# Patient Record
Sex: MALE | Race: OTHER | NOT HISPANIC OR LATINO | Employment: UNEMPLOYED | URBAN - METROPOLITAN AREA
[De-identification: names, ages, dates, MRNs, and addresses within clinical notes are randomized per-mention and may not be internally consistent; named-entity substitution may affect disease eponyms.]

---
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
## 2025-01-20 ENCOUNTER — APPOINTMENT (OUTPATIENT)
Dept: DERMATOLOGY | Facility: CLINIC | Age: 20
End: 2025-01-20
Payer: COMMERCIAL

## 2025-01-20 ENCOUNTER — LAB (OUTPATIENT)
Dept: LAB | Facility: LAB | Age: 20
End: 2025-01-20
Payer: COMMERCIAL

## 2025-01-20 DIAGNOSIS — L30.8 RETINOID DERMATITIS: ICD-10-CM

## 2025-01-20 DIAGNOSIS — L70.0 ACNE VULGARIS: Primary | ICD-10-CM

## 2025-01-20 DIAGNOSIS — Z79.899 HIGH RISK MEDICATION USE: ICD-10-CM

## 2025-01-20 DIAGNOSIS — K13.0 CHEILITIS: ICD-10-CM

## 2025-01-20 DIAGNOSIS — L85.3 XEROSIS CUTIS: ICD-10-CM

## 2025-01-20 DIAGNOSIS — L70.0 ACNE VULGARIS: ICD-10-CM

## 2025-01-20 PROCEDURE — 84450 TRANSFERASE (AST) (SGOT): CPT

## 2025-01-20 PROCEDURE — 84478 ASSAY OF TRIGLYCERIDES: CPT

## 2025-01-20 PROCEDURE — 99214 OFFICE O/P EST MOD 30 MIN: CPT | Performed by: DERMATOLOGY

## 2025-01-20 PROCEDURE — 84460 ALANINE AMINO (ALT) (SGPT): CPT

## 2025-01-20 PROCEDURE — 85027 COMPLETE CBC AUTOMATED: CPT

## 2025-01-20 NOTE — PROGRESS NOTES
Bud Chapin is a 19 y.o. male who presents for the following: Acne isotretinoin follow-up (The patient is currently taking prescription Accutane/Isotretinoin. The patient denies headaches, gastrointestinal upset, changes in mood, depression, suicidal ideation, bloody stool, vision changes, or joint pain./The patient endorses having dry lips and dry skin, managed with emollients.) and Retinoid Dermatitis (Hands- pt states using clobetasol ointment prn with good response. Improving. ).     Review of Systems:  No other skin or systemic complaints other than what is documented elsewhere in the note.    The following portions of the chart were reviewed this encounter and updated as appropriate:   Allergies  Meds  Problems  Med Hx  Surg Hx  Fam Hx                Objective   Well appearing patient in no apparent distress; mood and affect are within normal limits.    A focused skin examination was performed. All findings within normal limits unless otherwise noted below.    Assessment/Plan   1. Acne vulgaris  Head - Anterior (Face)  One active pustule R jaw line; PIE    -Given recalcitrant nature of acne, the patient continues on second course of Rx Accutane/Isotretinoin.   -Tolerating well with no systemic side effects  -Will check CBC, LFTs, TGL prior to monthly prescription dispensing; standing labs in Epic; will send rx after labs received and reviewed.   -Potential side effects reviewed with the patient today including teratogenicity and birth defects, lipid abnormalities (hyperTGL which may result in pancreatitis), liver or kidney failure, risk of depression or suicide, risk of potential inflammatory bowel disease  -Common and expected side effects include xerosis (dryness) of the lips and skin, nose bleeds due to dryness of the nasal mucosa, and redness/rash of the skin (retinoid dermatitis). -Recommend liberal emollients (Vaseline to the lips, Aveeno Eczema Therapy to the body) to be used  daily.  -Discussed with the patient that they should not get anyone pregnant while on therapy, do not donate blood, and do not give their medication to anyone. The patient verbalizes understanding and agreement.  -Signed iPledge consent form is on file    -If labs permissible, plan to continue rx Accutane/Isotretinoin as tolerated until a cumulative dose of 220 mg/kg is obtained.   -Patient is to take the medication with a fatty food/meal to aid in absorption of the medication (if not taking branded Absorica)    [id number]  -Pt weight (kg): 76kg  -Goal dose (220mg/kg): 16,700 mg  -Current cumulative dose: 9,600 mg    Related Procedures  Follow Up In Dermatology - Established Patient    Related Medications  ISOtretinoin (Accutane) 40 mg capsule  Take one pill by mouth twice daily with a fatty meal    2. Retinoid dermatitis  Left Hand - Posterior, Right Hand - Posterior  Clear today    Improved  -May restart clobetasol ointment twice daily when active as needed  -Discussed with/information given to the patient on the risks, benefits and alternatives of the usage of topical corticosteroids, including but not limited to: atrophy (thinning of the skin), striae (stretch marks), telangiectasia (blood vessel growth), and dyspigmentation (discoloration of the skin).  -Recommend to limit long-term use of topical corticosteroids to less than 14 days per month to reduce risk of side effects.      Related Medications  clobetasol (Temovate) 0.05 % ointment  Apply to affected areas on hands twice daily when active as needed. Use less than 14 days per month.    3. Cheilitis  Lips  Erythema and xerosis of vermilion lips    -Discussed nature of condition  -Expected cutaneous side effect for rx Accutane/Isotretinoin  -Use liberal emollients with plain vaseline as many times per day as possible  -Avoid traditional chap sticks as these often contain desiccants which worsen the issue  -Prescription topical steroids may be used  if needed    4. Xerosis cutis  Dry skin    -Continue liberal emollients with moisturizers designed for eczematous skin  -Continue gentle skin cleansers designed for eczematous skin  -Continue careful sun protection with SPF at least 30 as well as sun avoidance        Follow up in 1 month for acne (accutane)  Discussed if there are any changes or development of concerning symptoms (lesion/skin condition is changing, bleeding, enlarging, or worsening) the patient is to contact my office. The patient verbalizes understanding.    Maren Amado MD  1/20/2025      Addendum: Labs received. Permissible to continue accutane. Rx sent. Confirmed in iPledge. Patient may now  medication. Nurse to notify patient.    Maren Amado MD  01/21/25

## 2025-01-21 LAB
ALT SERPL W P-5'-P-CCNC: 24 U/L (ref 10–52)
AST SERPL W P-5'-P-CCNC: 21 U/L (ref 9–39)
ERYTHROCYTE [DISTWIDTH] IN BLOOD BY AUTOMATED COUNT: 12.5 % (ref 11.5–14.5)
HCT VFR BLD AUTO: 45.6 % (ref 41–52)
HGB BLD-MCNC: 15.3 G/DL (ref 13.5–17.5)
MCH RBC QN AUTO: 28.7 PG (ref 26–34)
MCHC RBC AUTO-ENTMCNC: 33.6 G/DL (ref 32–36)
MCV RBC AUTO: 86 FL (ref 80–100)
NRBC BLD-RTO: 0 /100 WBCS (ref 0–0)
PLATELET # BLD AUTO: 240 X10*3/UL (ref 150–450)
RBC # BLD AUTO: 5.33 X10*6/UL (ref 4.5–5.9)
TRIGL SERPL-MCNC: 185 MG/DL (ref 0–89)
WBC # BLD AUTO: 6.2 X10*3/UL (ref 4.4–11.3)

## 2025-01-21 RX ORDER — ISOTRETINOIN 40 MG/1
CAPSULE ORAL
Qty: 60 CAPSULE | Refills: 0 | Status: SHIPPED | OUTPATIENT
Start: 2025-01-21

## 2025-02-19 ENCOUNTER — APPOINTMENT (OUTPATIENT)
Dept: DERMATOLOGY | Facility: CLINIC | Age: 20
End: 2025-02-19
Payer: COMMERCIAL

## 2025-02-19 DIAGNOSIS — L98.9 DERMATOLOGIC PROBLEM: ICD-10-CM

## 2025-02-19 DIAGNOSIS — K13.0 CHEILITIS: ICD-10-CM

## 2025-02-19 DIAGNOSIS — L70.0 ACNE VULGARIS: Primary | ICD-10-CM

## 2025-02-19 DIAGNOSIS — L30.8 RETINOID DERMATITIS: ICD-10-CM

## 2025-02-19 DIAGNOSIS — L85.3 XEROSIS CUTIS: ICD-10-CM

## 2025-02-19 DIAGNOSIS — Z79.899 HIGH RISK MEDICATION USE: ICD-10-CM

## 2025-02-19 PROCEDURE — 99214 OFFICE O/P EST MOD 30 MIN: CPT | Performed by: DERMATOLOGY

## 2025-02-19 RX ORDER — CLOBETASOL PROPIONATE 0.5 MG/G
OINTMENT TOPICAL
Qty: 30 G | Refills: 1 | Status: SHIPPED | OUTPATIENT
Start: 2025-02-19

## 2025-02-19 NOTE — PROGRESS NOTES
Subjective     Lori Chapin is a 20 y.o. male who presents for the following: Acne isotretinoin follow-up (The patient is currently taking prescription Accutane/Isotretinoin. The patient denies headaches, gastrointestinal upset, changes in mood, depression, suicidal ideation, bloody stool, vision changes, or joint pain./The patient endorses having dry lips and dry skin, managed with emollients.).     Review of Systems:  No other skin or systemic complaints other than what is documented elsewhere in the note.    The following portions of the chart were reviewed this encounter and updated as appropriate:   Allergies  Meds  Problems  Med Hx  Surg Hx  Fam Hx                  Objective   Well appearing patient in no apparent distress; mood and affect are within normal limits.    A focused skin examination was performed. All findings within normal limits unless otherwise noted below.    Assessment/Plan   1. Acne vulgaris  Head - Anterior (Face)  Clear today; PIE    -Given recalcitrant nature of acne, the patient continues on second course of Rx Accutane/Isotretinoin.   -Tolerating well with no systemic side effects  -Will check CBC, LFTs, TGL prior to monthly prescription dispensing; standing labs in [facility]; will send rx after labs received and reviewed.   -Potential side effects reviewed with the patient today including teratogenicity and birth defects, lipid abnormalities (hyperTGL which may result in pancreatitis), liver or kidney failure, risk of depression or suicide, risk of potential inflammatory bowel disease  -Common and expected side effects include xerosis (dryness) of the lips and skin, nose bleeds due to dryness of the nasal mucosa, and redness/rash of the skin (retinoid dermatitis). -Recommend liberal emollients (Vaseline to the lips, Aveeno Eczema Therapy to the body) to be used daily.  -Discussed with the patient that they should not get anyone pregnant while on therapy, do not donate blood, and do  not give their medication to anyone. The patient verbalizes understanding and agreement.  -Signed iPledge consent form is on file    -If labs permissible, plan to continue rx Accutane/Isotretinoin as tolerated until a cumulative dose of 220 mg/kg is obtained.   -Patient is to take the medication with a fatty food/meal to aid in absorption of the medication (if not taking branded Absorica)    [id number]  -Pt weight (kg): 76kg  -Goal dose (220mg/kg): 16,700 mg  -Current cumulative dose: 12,000 mg    Related Procedures  Follow Up In Dermatology - Established Patient    Related Medications  ISOtretinoin (Accutane) 40 mg capsule  Take one pill by mouth twice daily with a fatty meal    2. Cheilitis  Lips  Erythema and xerosis of vermilion lips    -Discussed nature of condition  -Expected cutaneous side effect for rx Accutane/Isotretinoin  -Use liberal emollients with plain vaseline as many times per day as possible  -Avoid traditional chap sticks as these often contain desiccants which worsen the issue  -Prescription topical steroids may be used if needed    3. Xerosis cutis  Dry skin    -Continue liberal emollients with moisturizers designed for eczematous skin  -Continue gentle skin cleansers designed for eczematous skin  -Continue careful sun protection with SPF at least 30 as well as sun avoidance    4. Retinoid dermatitis  Left Hand - Posterior, Right Hand - Posterior  Erythematous plaques on the hands    Flaring  -Restart clobetasol ointment twice daily when active as needed; refilled today  -Discussed with/information given to the patient on the risks, benefits and alternatives of the usage of topical corticosteroids, including but not limited to: atrophy (thinning of the skin), striae (stretch marks), telangiectasia (blood vessel growth), and dyspigmentation (discoloration of the skin).  -Recommend to limit long-term use of topical corticosteroids to less than 14 days per month to reduce risk of side  effects.      Related Medications  clobetasol (Temovate) 0.05 % ointment  Apply to affected areas on hands twice daily when active as needed. Use less than 14 days per month.    5. Dermatologic problem    Related Procedures  Follow Up In Dermatology - Established Patient        Follow up in 1 month for accutane  Discussed if there are any changes or development of concerning symptoms (lesion/skin condition is changing, bleeding, enlarging, or worsening) the patient is to contact my office. The patient verbalizes understanding.    Maren Amado MD  2/19/2025    Addendum: Labs received. Permissible to continue accutane. Rx sent to patient's pharmacy. Confirmed in iPledge. Nurse to notify patient.  Maren Amado MD  02/20/25

## 2025-02-20 LAB
ALT SERPL-CCNC: 39 U/L (ref 9–46)
AST SERPL-CCNC: 29 U/L (ref 10–40)
ERYTHROCYTE [DISTWIDTH] IN BLOOD BY AUTOMATED COUNT: 12.8 % (ref 11–15)
HCT VFR BLD AUTO: 47.1 % (ref 38.5–50)
HGB BLD-MCNC: 15.4 G/DL (ref 13.2–17.1)
MCH RBC QN AUTO: 28.5 PG (ref 27–33)
MCHC RBC AUTO-ENTMCNC: 32.7 G/DL (ref 32–36)
MCV RBC AUTO: 87.2 FL (ref 80–100)
PLATELET # BLD AUTO: 260 THOUSAND/UL (ref 140–400)
PMV BLD REES-ECKER: 10.2 FL (ref 7.5–12.5)
RBC # BLD AUTO: 5.4 MILLION/UL (ref 4.2–5.8)
TRIGL SERPL-MCNC: 177 MG/DL
WBC # BLD AUTO: 5.5 THOUSAND/UL (ref 3.8–10.8)

## 2025-02-20 RX ORDER — ISOTRETINOIN 40 MG/1
CAPSULE ORAL
Qty: 60 CAPSULE | Refills: 0 | Status: SHIPPED | OUTPATIENT
Start: 2025-02-20

## 2025-02-20 NOTE — RESULT ENCOUNTER NOTE
RN called pt and sent to voicemail. RN left voicemail per communication form. RN notified in vm that labs  are received and it is permissible to continue accutane. RN in vm notified Rx sent to patient's pharmacy and confirmed in iPledge.    Sara Gonzalez BSN RN

## 2025-03-19 ENCOUNTER — APPOINTMENT (OUTPATIENT)
Dept: DERMATOLOGY | Facility: CLINIC | Age: 20
End: 2025-03-19
Payer: COMMERCIAL

## 2025-03-19 DIAGNOSIS — Z79.899 HIGH RISK MEDICATION USE: ICD-10-CM

## 2025-03-19 DIAGNOSIS — K13.0 CHEILITIS: ICD-10-CM

## 2025-03-19 DIAGNOSIS — L98.9 DERMATOLOGIC PROBLEM: ICD-10-CM

## 2025-03-19 DIAGNOSIS — L70.0 ACNE VULGARIS: Primary | ICD-10-CM

## 2025-03-19 DIAGNOSIS — L85.3 XEROSIS CUTIS: ICD-10-CM

## 2025-03-19 DIAGNOSIS — L30.8 RETINOID DERMATITIS: ICD-10-CM

## 2025-03-19 PROCEDURE — 99214 OFFICE O/P EST MOD 30 MIN: CPT | Performed by: DERMATOLOGY

## 2025-03-19 NOTE — PROGRESS NOTES
Bud Chapin is a 20 y.o. male who presents for the following: Acne isotretinoin follow-up (.The patient is currently taking prescription Accutane/Isotretinoin. The patient denies headaches, gastrointestinal upset, changes in mood, depression, suicidal ideation, bloody stool, vision changes, or joint pain./The patient endorses having dry lips and dry skin, managed with emollients.).     Review of Systems:  No other skin or systemic complaints other than what is documented elsewhere in the note.    The following portions of the chart were reviewed this encounter and updated as appropriate:   Allergies  Meds  Problems  Med Hx  Surg Hx  Fam Hx                Objective   Well appearing patient in no apparent distress; mood and affect are within normal limits.    A focused skin examination was performed. All findings within normal limits unless otherwise noted below.    Assessment/Plan   1. Acne vulgaris  Head - Anterior (Face)  Clear today    -Given recalcitrant nature of acne, the patient continues on second course of Rx Accutane/Isotretinoin.   -Tolerating well with no systemic side effects  -Will check CBC, LFTs, TGL prior to monthly prescription dispensing; [facility] labs in [facility]; will send rx after labs received and reviewed.   -This will be the last month of accutane; after next rx patient will have reached goal dose  -Potential side effects reviewed with the patient today including teratogenicity and birth defects, lipid abnormalities (hyperTGL which may result in pancreatitis), liver or kidney failure, risk of depression or suicide, risk of potential inflammatory bowel disease  -Common and expected side effects include xerosis (dryness) of the lips and skin, nose bleeds due to dryness of the nasal mucosa, and redness/rash of the skin (retinoid dermatitis). -Recommend liberal emollients (Vaseline to the lips, Aveeno Eczema Therapy to the body) to be used daily.  -Discussed with the patient  that they should not get anyone pregnant while on therapy, do not donate blood, and do not give their medication to anyone. The patient verbalizes understanding and agreement.  -Signed iPledge consent form is on file    -If labs permissible, plan to continue rx Accutane/Isotretinoin as tolerated until a cumulative dose of 220 mg/kg is obtained.   -Patient is to take the medication with a fatty food/meal to aid in absorption of the medication (if not taking branded Absorica)    [id number]  -Pt weight (kg): 76kg  -Goal dose (220mg/kg): 16,700 mg  -Current cumulative dose: 14,400 mg    Related Medications  ISOtretinoin (Accutane) 40 mg capsule  Take one pill by mouth twice daily with a fatty meal    2. Cheilitis  Lips  Erythema and xerosis of vermilion lips    -Discussed nature of condition  -Expected cutaneous side effect for rx Accutane/Isotretinoin  -Use liberal emollients with plain vaseline as many times per day as possible  -Avoid traditional chap sticks as these often contain desiccants which worsen the issue  -Prescription topical steroids may be used if needed    3. Xerosis cutis  Dry skin    -Continue liberal emollients with moisturizers designed for eczematous skin  -Continue gentle skin cleansers designed for eczematous skin  -Continue careful sun protection with SPF at least 30 as well as sun avoidance    4. Retinoid dermatitis  Left Hand - Posterior, Right Hand - Posterior  Mild erythema on the dorsal hands today    Improving  -May restart clobetasol ointment twice daily when active as needed  -Discussed with/information given to the patient on the risks, benefits and alternatives of the usage of topical corticosteroids, including but not limited to: atrophy (thinning of the skin), striae (stretch marks), telangiectasia (blood vessel growth), and dyspigmentation (discoloration of the skin).  -Recommend to limit long-term use of topical corticosteroids to less than 14 days per month to reduce  risk of side effects.      Related Medications  clobetasol (Temovate) 0.05 % ointment  Apply to affected areas on hands twice daily when active as needed. Use less than 14 days per month.    5. Dermatologic problem    Related Procedures  Follow Up In Dermatology - Established Patient        Follow up as needed if acne recurs. Second accutane course is completed.  Discussed if there are any changes or development of concerning symptoms (lesion/skin condition is changing, bleeding, enlarging, or worsening) the patient is to contact my office. The patient verbalizes understanding.    Maren Amado MD  3/19/2025      Addendum: Labs received and reviewed. WNL. Permissible to continue accutane and complete course. Rx for 40mg isotretinoin BID sent to patient's pharmacy. Confirmed in iPledge. Nurse to notify the patient.    Maren Amado MD  03/20/25

## 2025-03-20 LAB
ALT SERPL-CCNC: 21 U/L (ref 9–46)
AST SERPL-CCNC: 22 U/L (ref 10–40)
ERYTHROCYTE [DISTWIDTH] IN BLOOD BY AUTOMATED COUNT: 12.8 % (ref 11–15)
HCT VFR BLD AUTO: 45.6 % (ref 38.5–50)
HGB BLD-MCNC: 15.4 G/DL (ref 13.2–17.1)
MCH RBC QN AUTO: 29.1 PG (ref 27–33)
MCHC RBC AUTO-ENTMCNC: 33.8 G/DL (ref 32–36)
MCV RBC AUTO: 86.2 FL (ref 80–100)
PLATELET # BLD AUTO: 298 THOUSAND/UL (ref 140–400)
PMV BLD REES-ECKER: 9.6 FL (ref 7.5–12.5)
RBC # BLD AUTO: 5.29 MILLION/UL (ref 4.2–5.8)
TRIGL SERPL-MCNC: 100 MG/DL
WBC # BLD AUTO: 6.4 THOUSAND/UL (ref 3.8–10.8)

## 2025-03-20 RX ORDER — ISOTRETINOIN 40 MG/1
CAPSULE ORAL
Qty: 60 CAPSULE | Refills: 0 | Status: SHIPPED | OUTPATIENT
Start: 2025-03-20

## 2025-03-20 NOTE — RESULT ENCOUNTER NOTE
RN called pt and phone was answered by pt's mother- Sumanth Brandon. Per communication form- ok to update mother. RN notified mother his Accutane labs were received and are WNL. RN notified mother his Accutane Rx was sent to his pharmacy and was confirmed on iPledge. Pt's mother confirmed understanding and has no further questions or concerns.    Sara AGUIRREN RN